# Patient Record
Sex: MALE | Race: WHITE | NOT HISPANIC OR LATINO | Employment: UNEMPLOYED | URBAN - METROPOLITAN AREA
[De-identification: names, ages, dates, MRNs, and addresses within clinical notes are randomized per-mention and may not be internally consistent; named-entity substitution may affect disease eponyms.]

---

## 2020-06-22 ENCOUNTER — OFFICE VISIT - HEALTHEAST (OUTPATIENT)
Dept: FAMILY MEDICINE | Facility: CLINIC | Age: 25
End: 2020-06-22

## 2020-06-22 DIAGNOSIS — L03.031 PARONYCHIA OF TOE, RIGHT: ICD-10-CM

## 2020-06-22 ASSESSMENT — MIFFLIN-ST. JEOR: SCORE: 1812.72

## 2020-06-23 ENCOUNTER — COMMUNICATION - HEALTHEAST (OUTPATIENT)
Dept: FAMILY MEDICINE | Facility: CLINIC | Age: 25
End: 2020-06-23

## 2021-06-04 VITALS — HEIGHT: 70 IN | BODY MASS INDEX: 25.77 KG/M2 | WEIGHT: 180 LBS

## 2021-06-09 NOTE — TELEPHONE ENCOUNTER
Medication Question or Clarification  Who is calling: [facility] Nair pharmacist  What medication are you calling about (include dose and sig)?:   Disp  Refills  Start  End      cephalexin (KEFLEX) 500 MG capsule  20 capsule  0  6/22/2020 7/2/2020     Sig - Route: Take 1 capsule (500 mg total) by mouth 4 (four) times a day for 10 days. - Oral     Sent to pharmacy as: cephalexin 500 mg capsule (KEFLEX)     E-Prescribing Status: Receipt confirmed by pharmacy (6/22/2020  2:14 PM CDT)         Who prescribed the medication?: Dr. Patel  What is your question/concern?: Caller stated the patient told them he thought he was supposed to get a twice a day dose and not one cap four times a day. Please clarify the sig and quantity because they do not match and let [facility] know.  Requested Pharmacy: [facility]  Okay to leave a detailed message?: No

## 2021-06-29 NOTE — PROGRESS NOTES
"Progress Notes by Javier Patel DO at 6/22/2020  1:40 PM     Author: Javier Patel DO Service: -- Author Type: Physician    Filed: 6/22/2020  5:34 PM Encounter Date: 6/22/2020 Status: Signed    : Javier Patel DO (Physician)       Brent Gastelum is a 24 y.o. male who is being evaluated via a billable video visit.      The patient has been notified of following:     \"This video visit will be conducted via a call between you and your physician/provider. We have found that certain health care needs can be provided without the need for an in-person physical exam.  This service lets us provide the care you need with a video conversation.  If a prescription is necessary we can send it directly to your pharmacy.  If lab work is needed we can place an order for that and you can then stop by our lab to have the test done at a later time.    Video visits are billed at different rates depending on your insurance coverage. Please reach out to your insurance provider with any questions.    If during the course of the call the physician/provider feels a video visit is not appropriate, you will not be charged for this service.\"    Patient has given verbal consent to a Video visit? Yes    Pt states physical location as [place], [place] for Video Visit.    How would you like to obtain your AVS? AVS Preference: Mail a copy.     Patient would like the video invitation sent by: Text to cell phone: [phone]    Will anyone else be joining your video visit? No        Video Start Time: 1:58 PM    Additional provider notes:     Assessment/Plan:     Problem List Items Addressed This Visit     None      Visit Diagnoses     Paronychia of toe, right    -  Primary    Proper nail care discussed.  Will treat with Keflex.  Warning signs and symptoms for return clinic discussed.    Relevant Medications    cephalexin (KEFLEX) 500 MG capsule        Return in about 5 months (around 12/1/2020) for Annual " "physical.    Subjective:   24 y.o. male presents for pain in right great toe.  Started about 2 days ago.  He does not recall stubbing it.  He does admit approximately 2 days prior to that he was trimming his toenail and there was some \"LInt\" along the edge of the nail that he picked out with that.  He does remember getting some slight bleeding.  It was a little sore after that but now it has gotten worse.  Last night the pain was worse than the night before.  Does not hurt in the joint.  Only hurts to press on the area.  Has not expressed any fluid from it.  Has not noticed any nail plate changes.        Review of Systems   Constitutional: Negative for chills, fatigue and fever.   Eyes: Negative for visual disturbance.   Respiratory: Negative for chest tightness and shortness of breath.    Cardiovascular: Negative for chest pain, palpitations and leg swelling.   Gastrointestinal: Negative for constipation, diarrhea, nausea and vomiting.   Genitourinary: Negative for difficulty urinating.        History     Reviewed By Date/Time Sections Reviewed    Javier Patel,  6/22/2020  1:55 PM Medical, Surgical, Tobacco, Family, Socioeconomic    WaqasDalton naqvi Jr., [facility] 6/22/2020  1:33 PM Family    Dalton Alan Jr., [facility] 6/22/2020  1:29 PM Medical, Surgical    Dalton Alan Jr., [facility] 6/22/2020  1:27 PM Socioeconomic    Dalton Alan Jr., [facility] 6/22/2020  1:25 PM Tobacco, Alcohol, Drug Use, Sexual Activity           Objective:     Vitals:    06/22/20 1324   Weight: 180 lb (81.6 kg)   Height: 5' 10\" (1.778 m)     Physical Exam  Constitutional:       General: He is not in acute distress.     Appearance: Normal appearance.   HENT:      Head: Normocephalic and atraumatic.   Musculoskeletal:      Right lower leg: No edema.      Left lower leg: No edema.        Feet:    Skin:     Capillary Refill: Capillary refill takes less than 2 seconds.   Neurological:      Mental Status: He is alert and oriented to person, " place, and time.   Psychiatric:         Mood and Affect: Mood normal.         Thought Content: Thought content normal.         This note has been dictated using voice recognition software. Any grammatical or context distortions are unintentional and inherent to the software    Video-Visit Details    Type of service:  Video Visit    Video End Time (time video stopped): 2:15 PM  Originating Location (pt. Location): Home    Distant Location (provider location):  [facility] MEDICINE/OB     Platform used for Video Visit: Michelle Patel, DO

## 2021-08-07 ENCOUNTER — HEALTH MAINTENANCE LETTER (OUTPATIENT)
Age: 26
End: 2021-08-07

## 2021-10-02 ENCOUNTER — HEALTH MAINTENANCE LETTER (OUTPATIENT)
Age: 26
End: 2021-10-02

## 2022-09-03 ENCOUNTER — HEALTH MAINTENANCE LETTER (OUTPATIENT)
Age: 27
End: 2022-09-03

## 2023-09-30 ENCOUNTER — HEALTH MAINTENANCE LETTER (OUTPATIENT)
Age: 28
End: 2023-09-30